# Patient Record
Sex: MALE | Race: OTHER
[De-identification: names, ages, dates, MRNs, and addresses within clinical notes are randomized per-mention and may not be internally consistent; named-entity substitution may affect disease eponyms.]

---

## 2019-07-23 ENCOUNTER — HOSPITAL ENCOUNTER (EMERGENCY)
Dept: HOSPITAL 72 - EMR | Age: 29
Discharge: HOME | End: 2019-07-23
Payer: MEDICAID

## 2019-07-23 VITALS — BODY MASS INDEX: 31.39 KG/M2 | WEIGHT: 200 LBS | HEIGHT: 67 IN

## 2019-07-23 VITALS — SYSTOLIC BLOOD PRESSURE: 121 MMHG | DIASTOLIC BLOOD PRESSURE: 76 MMHG

## 2019-07-23 DIAGNOSIS — S30.0XXA: Primary | ICD-10-CM
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
## 2019-07-23 DIAGNOSIS — E11.9: ICD-10-CM
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
## 2019-07-23 PROCEDURE — 72131 CT LUMBAR SPINE W/O DYE: CPT

## 2019-07-23 PROCEDURE — 99284 EMERGENCY DEPT VISIT MOD MDM: CPT
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
## 2019-07-24 NOTE — DIAGNOSTIC IMAGING REPORT
Indication: Back pain

 

Technique: Continuous helical transaxial imaging of the lumbar spine was obtained. 

No IV contrast was administered.  Coronal 2-D reformats were also obtained. Study

obtained in a Siemens sensation 64 slice CT. 

 

Total Dose length Product (DLP):  668.64 mGycm

 

CT Dose Index Volume (CTDIvol):   18.87 mGy

 

 

Comparison: None

 

Findings: There is no evidence of an acute fracture or malalignment. Height and

configuration of the vertebral bodies and intervertebral discs are within normal

limits. The facets are unremarkable. There is no soft tissue swelling. Bladder is

distended.

 

 

Impression: Negative lumbar spine CT

 

 

 

 

The CT scanner at [facility] is accredited by the [employer] College of

Radiology and the scans are performed using dose optimization techniques as

appropriate to a performed exam including Automatic Exposure control.